# Patient Record
Sex: FEMALE | Race: WHITE | NOT HISPANIC OR LATINO | Employment: OTHER | URBAN - METROPOLITAN AREA
[De-identification: names, ages, dates, MRNs, and addresses within clinical notes are randomized per-mention and may not be internally consistent; named-entity substitution may affect disease eponyms.]

---

## 2017-07-17 ENCOUNTER — LAB VISIT (OUTPATIENT)
Dept: LAB | Facility: HOSPITAL | Age: 82
End: 2017-07-17
Attending: FAMILY MEDICINE
Payer: MEDICARE

## 2017-07-17 ENCOUNTER — TELEPHONE (OUTPATIENT)
Dept: INTERNAL MEDICINE | Facility: CLINIC | Age: 82
End: 2017-07-17

## 2017-07-17 DIAGNOSIS — Z79.01 LONG TERM (CURRENT) USE OF ANTICOAGULANTS: Primary | ICD-10-CM

## 2017-07-17 DIAGNOSIS — Z79.01 LONG TERM (CURRENT) USE OF ANTICOAGULANTS: ICD-10-CM

## 2017-07-17 LAB
INR PPP: 5
PROTHROMBIN TIME: 49.7 SEC

## 2017-07-17 PROCEDURE — 36415 COLL VENOUS BLD VENIPUNCTURE: CPT | Mod: PO

## 2017-07-17 PROCEDURE — 85610 PROTHROMBIN TIME: CPT | Mod: PO

## 2017-07-25 ENCOUNTER — LAB VISIT (OUTPATIENT)
Dept: LAB | Facility: HOSPITAL | Age: 82
End: 2017-07-25
Attending: INTERNAL MEDICINE
Payer: MEDICARE

## 2017-07-25 DIAGNOSIS — Z79.01 LONG TERM (CURRENT) USE OF ANTICOAGULANTS: ICD-10-CM

## 2017-07-25 DIAGNOSIS — Z79.01 LONG TERM (CURRENT) USE OF ANTICOAGULANTS: Primary | ICD-10-CM

## 2017-07-25 LAB
INR PPP: 3.9
PROTHROMBIN TIME: 39.1 SEC

## 2017-07-25 PROCEDURE — 36415 COLL VENOUS BLD VENIPUNCTURE: CPT | Mod: PO

## 2017-07-25 PROCEDURE — 85610 PROTHROMBIN TIME: CPT | Mod: PO

## 2017-07-27 ENCOUNTER — TELEPHONE (OUTPATIENT)
Dept: RADIOLOGY | Facility: HOSPITAL | Age: 82
End: 2017-07-27

## 2017-07-28 ENCOUNTER — HOSPITAL ENCOUNTER (OUTPATIENT)
Dept: RADIOLOGY | Facility: HOSPITAL | Age: 82
Discharge: HOME | End: 2017-07-28
Attending: UROLOGY
Payer: MEDICARE

## 2017-07-28 DIAGNOSIS — R30.0 DYSURIA: ICD-10-CM

## 2017-07-28 PROCEDURE — 76770 US EXAM ABDO BACK WALL COMP: CPT | Mod: TC,PO

## 2017-07-28 PROCEDURE — 76770 US EXAM ABDO BACK WALL COMP: CPT | Mod: 26,,, | Performed by: RADIOLOGY

## 2017-08-10 ENCOUNTER — LAB VISIT (OUTPATIENT)
Dept: LAB | Facility: HOSPITAL | Age: 82
End: 2017-08-10
Attending: INTERNAL MEDICINE
Payer: MEDICARE

## 2017-08-10 DIAGNOSIS — Z79.01 LONG TERM (CURRENT) USE OF ANTICOAGULANTS: Primary | ICD-10-CM

## 2017-08-10 DIAGNOSIS — Z79.01 LONG TERM (CURRENT) USE OF ANTICOAGULANTS: ICD-10-CM

## 2017-08-10 LAB
INR PPP: 2.8
PROTHROMBIN TIME: 27.8 SEC

## 2017-08-10 PROCEDURE — 36415 COLL VENOUS BLD VENIPUNCTURE: CPT | Mod: PO

## 2017-08-10 PROCEDURE — 85610 PROTHROMBIN TIME: CPT | Mod: PO

## 2017-08-17 ENCOUNTER — LAB VISIT (OUTPATIENT)
Dept: LAB | Facility: HOSPITAL | Age: 82
End: 2017-08-17
Attending: FAMILY MEDICINE
Payer: MEDICARE

## 2017-08-17 DIAGNOSIS — Z79.01 LONG TERM (CURRENT) USE OF ANTICOAGULANTS: ICD-10-CM

## 2017-08-17 LAB
INR PPP: 3.1
PROTHROMBIN TIME: 31.4 SEC

## 2017-08-17 PROCEDURE — 85610 PROTHROMBIN TIME: CPT | Mod: PO

## 2017-08-17 PROCEDURE — 36415 COLL VENOUS BLD VENIPUNCTURE: CPT | Mod: PO

## 2017-08-24 ENCOUNTER — LAB VISIT (OUTPATIENT)
Dept: LAB | Facility: HOSPITAL | Age: 82
End: 2017-08-24
Attending: INTERNAL MEDICINE
Payer: MEDICARE

## 2017-08-24 DIAGNOSIS — Z79.01 LONG TERM (CURRENT) USE OF ANTICOAGULANTS: ICD-10-CM

## 2017-08-24 LAB
INR PPP: 2.7
PROTHROMBIN TIME: 27.6 SEC

## 2017-08-24 PROCEDURE — 85610 PROTHROMBIN TIME: CPT | Mod: PO

## 2017-08-24 PROCEDURE — 36415 COLL VENOUS BLD VENIPUNCTURE: CPT | Mod: PO

## 2017-08-31 ENCOUNTER — LAB VISIT (OUTPATIENT)
Dept: LAB | Facility: HOSPITAL | Age: 82
End: 2017-08-31
Attending: INTERNAL MEDICINE
Payer: MEDICARE

## 2017-08-31 DIAGNOSIS — Z79.01 LONG TERM (CURRENT) USE OF ANTICOAGULANTS: ICD-10-CM

## 2017-08-31 LAB
INR PPP: 3.1
PROTHROMBIN TIME: 30.9 SEC

## 2017-08-31 PROCEDURE — 85610 PROTHROMBIN TIME: CPT | Mod: PO

## 2017-08-31 PROCEDURE — 36415 COLL VENOUS BLD VENIPUNCTURE: CPT | Mod: PO

## 2017-11-29 ENCOUNTER — HOSPITAL ENCOUNTER (OUTPATIENT)
Dept: RADIOLOGY | Facility: HOSPITAL | Age: 82
Discharge: HOME | End: 2017-11-29
Attending: NURSE PRACTITIONER
Payer: MEDICARE

## 2017-11-29 DIAGNOSIS — J40 CATARRHAL BRONCHITIS: ICD-10-CM

## 2017-11-29 DIAGNOSIS — J40 CATARRHAL BRONCHITIS: Primary | ICD-10-CM

## 2017-11-29 PROCEDURE — 71020 XR CHEST PA AND LATERAL: CPT | Mod: 26,,, | Performed by: RADIOLOGY

## 2017-11-29 PROCEDURE — 71020 XR CHEST PA AND LATERAL: CPT | Mod: TC,PO

## 2017-12-01 ENCOUNTER — HOSPITAL ENCOUNTER (EMERGENCY)
Facility: HOSPITAL | Age: 82
Discharge: HOME | End: 2017-12-01
Attending: EMERGENCY MEDICINE
Payer: MEDICARE

## 2017-12-01 VITALS
DIASTOLIC BLOOD PRESSURE: 56 MMHG | RESPIRATION RATE: 18 BRPM | WEIGHT: 158 LBS | BODY MASS INDEX: 23.33 KG/M2 | HEART RATE: 97 BPM | OXYGEN SATURATION: 97 % | TEMPERATURE: 98 F | SYSTOLIC BLOOD PRESSURE: 112 MMHG

## 2017-12-01 DIAGNOSIS — K80.20 CALCULUS OF GALLBLADDER WITHOUT CHOLECYSTITIS WITHOUT OBSTRUCTION: ICD-10-CM

## 2017-12-01 DIAGNOSIS — N28.1 RENAL CYST, LEFT: Primary | ICD-10-CM

## 2017-12-01 DIAGNOSIS — K74.60 CIRRHOSIS OF LIVER WITHOUT ASCITES, UNSPECIFIED HEPATIC CIRRHOSIS TYPE: ICD-10-CM

## 2017-12-01 DIAGNOSIS — N39.0 URINARY TRACT INFECTION WITHOUT HEMATURIA, SITE UNSPECIFIED: ICD-10-CM

## 2017-12-01 DIAGNOSIS — K76.89 LIVER NODULE: ICD-10-CM

## 2017-12-01 LAB
BACTERIA #/AREA URNS AUTO: ABNORMAL /HPF
BILIRUB UR QL STRIP: NEGATIVE
CLARITY UR REFRACT.AUTO: ABNORMAL
COLOR UR AUTO: YELLOW
GLUCOSE UR QL STRIP: NEGATIVE
HGB UR QL STRIP: ABNORMAL
HYALINE CASTS UR QL AUTO: 1 /LPF
KETONES UR QL STRIP: NEGATIVE
LEUKOCYTE ESTERASE UR QL STRIP: ABNORMAL
MICROSCOPIC COMMENT: ABNORMAL
NITRITE UR QL STRIP: NEGATIVE
PH UR STRIP: 7 [PH] (ref 5–8)
PROT UR QL STRIP: NEGATIVE
RBC #/AREA URNS AUTO: 5 /HPF (ref 0–4)
SP GR UR STRIP: 1.01 (ref 1–1.03)
SQUAMOUS #/AREA URNS AUTO: 50 /HPF
URN SPEC COLLECT METH UR: ABNORMAL
UROBILINOGEN UR STRIP-ACNC: NEGATIVE EU/DL
WBC #/AREA URNS AUTO: 10 /HPF (ref 0–5)

## 2017-12-01 PROCEDURE — 99284 EMERGENCY DEPT VISIT MOD MDM: CPT

## 2017-12-01 PROCEDURE — 81000 URINALYSIS NONAUTO W/SCOPE: CPT

## 2017-12-01 RX ORDER — TRAMADOL HYDROCHLORIDE 50 MG/1
50 TABLET ORAL EVERY 6 HOURS PRN
Qty: 15 TABLET | Refills: 0 | Status: SHIPPED | OUTPATIENT
Start: 2017-12-01

## 2017-12-01 RX ORDER — DIGOXIN 125 MCG
125 TABLET ORAL DAILY
COMMUNITY

## 2017-12-01 RX ORDER — LEVOFLOXACIN 500 MG/1
500 TABLET, FILM COATED ORAL DAILY
COMMUNITY

## 2017-12-01 RX ORDER — TRAMADOL HYDROCHLORIDE 50 MG/1
50 TABLET ORAL EVERY 6 HOURS PRN
COMMUNITY
End: 2017-12-01

## 2017-12-01 RX ORDER — WARFARIN 2 MG/1
2 TABLET ORAL DAILY
COMMUNITY

## 2017-12-01 RX ORDER — FUROSEMIDE 20 MG/1
20 TABLET ORAL 2 TIMES DAILY
COMMUNITY

## 2017-12-01 RX ORDER — BETHANECHOL CHLORIDE 25 MG/1
10 TABLET ORAL 3 TIMES DAILY
COMMUNITY

## 2017-12-01 RX ORDER — METOCLOPRAMIDE HYDROCHLORIDE 5 MG/5ML
2.5 SOLUTION ORAL 2 TIMES DAILY
COMMUNITY

## 2017-12-01 NOTE — ED PROVIDER NOTES
Encounter Date: 12/1/2017       History     Chief Complaint   Patient presents with    Flank Pain     left flank pain, difficulty urinating     The patient currently presents with concerns of flank pain.  This is localized to the Left flank.  This began 2-3 days ago.  There is not associated nausea and vomiting.  Patient denies associated fever/chills.  There is not a history of prior kidney stones.  Hematuria has not been seen.  Patient is currently taking Levaquin for a presumed respiratory infection per her PCP.  The patient's x-ray completed earlier this week however was unremarkable.          Review of patient's allergies indicates:  No Known Allergies  Past Medical History:   Diagnosis Date    Arrhythmia     Hypertension      Past Surgical History:   Procedure Laterality Date    HYSTERECTOMY       No family history on file.  Social History   Substance Use Topics    Smoking status: Former Smoker     Quit date: 1980    Smokeless tobacco: Not on file    Alcohol use No     Review of Systems   Constitutional: Negative for chills and fever.   HENT: Negative for congestion and rhinorrhea.    Respiratory: Negative for cough, chest tightness, shortness of breath and wheezing.    Cardiovascular: Negative for chest pain, palpitations and leg swelling.   Gastrointestinal: Negative for abdominal pain, constipation, diarrhea, nausea and vomiting.   Genitourinary: Positive for flank pain. Negative for dysuria, frequency, urgency, vaginal bleeding and vaginal discharge.   Skin: Negative for color change and rash.   Allergic/Immunologic: Negative for immunocompromised state.   Neurological: Negative for dizziness, weakness and numbness.   Hematological: Negative for adenopathy. Does not bruise/bleed easily.   All other systems reviewed and are negative.      Physical Exam     Initial Vitals [12/01/17 1518]   BP Pulse Resp Temp SpO2   124/69 73 18 98.3 °F (36.8 °C) 97 %      MAP       87.33         Physical  Exam    Nursing note and vitals reviewed.  Constitutional: She appears well-developed and well-nourished. She is not diaphoretic. No distress.   HENT:   Head: Normocephalic and atraumatic.   Right Ear: External ear normal.   Left Ear: External ear normal.   Nose: Nose normal.   Mouth/Throat: Oropharynx is clear and moist.   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   Eyes: Conjunctivae and EOM are normal. Pupils are equal, round, and reactive to light. No scleral icterus.   Neck: Neck supple. No tracheal deviation present. No JVD present.   Cardiovascular: Normal rate, regular rhythm, normal heart sounds and intact distal pulses. Exam reveals no gallop and no friction rub.    No murmur heard.  Pulmonary/Chest: Breath sounds normal. No respiratory distress. She has no wheezes. She has no rhonchi. She has no rales.   Abdominal: Soft. Bowel sounds are normal. She exhibits no distension. There is no tenderness.   Musculoskeletal: Normal range of motion. She exhibits no edema.   Neurological: She is alert and oriented to person, place, and time. She has normal strength. No cranial nerve deficit or sensory deficit.   Skin: Skin is warm and dry. No rash noted.   Psychiatric: She has a normal mood and affect. Her behavior is normal.         ED Course   Procedures  Labs Reviewed   URINALYSIS - Abnormal; Notable for the following:        Result Value    Appearance, UA Hazy (*)     Occult Blood UA 1+ (*)     Leukocytes, UA Trace (*)     All other components within normal limits   URINALYSIS MICROSCOPIC - Abnormal; Notable for the following:     RBC, UA 5 (*)     WBC, UA 10 (*)     All other components within normal limits        Imaging Results          CT Renal Stone Study ABD Pelvis WO (Final result)  Result time 12/01/17 17:04:37    Final result by EVERARDO Rodriguez Sr., MD (12/01/17 17:04:37)                 Impression:      1. A PEG tube is in place. Its tip is located in the stomach. There  are moderate inflammatory changes adjacent to the PEG tube which also involves the superior aspect of the left side of the transverse portion of the colon.   2. There are multiple small stones in the dependent portion of the gallbladder. One of the larger stones measures 2 mm.   3. The liver has a nodular surface. This is characteristic of hepatic cirrhosis.  4. There is an 11 mm oval-shaped area of hypodensity in the anterior aspect of the left lobe of the liver. This area has a Hounsfield measurement of 3.   5. There is a 7 mm oval-shaped subtle hyperdense area in the medullary portion of the inferior pole of the left kidney. This may represent a hemorrhagic cyst.  6. There is a moderate amount of atherosclerosis.      All CT scans at this facility use dose modulation, iterative reconstruction, and/or weight base dosing when appropriate to reduce radiation dose when appropriate to reduce radiation dose to as low as reasonably achievable.      Electronically signed by: EVERARDO YI MD  Date:     12/01/17  Time:    17:04              Narrative:    CT of abdomen and pelvis without IV Contrast    History: Left flank pain    Technique: Standard abdomen and pelvis CT protocol without oral or IV contrast was performed.    Finding: The size of the heart is within normal limits. There is a mild amount of bronchiectasis in both lower lobes. There are mild dependent atelectatic changes in both lungs. There is no pneumothorax or pleural effusion.    There is an 11 mm oval-shaped area of hypodensity in the anterior aspect of the left lobe of the liver. This area has a Hounsfield measurement of 3. The liver has a nodular surface. There are multiple small stones in the dependent portion of the gallbladder. One of the larger stones measures 2 mm. There is mild generalized atrophy of the pancreas. The spleen and adrenals are normal in appearance. There is a mild amount of bilateral perinephric stranding. There is a 7 mm oval-shaped  subtle hyperdense area in the medullary portion of the inferior pole of the left kidney. The ureters and the urinary bladder are normal in appearance. The appendix is absent. There are no inflammatory changes in expected location of the appendix. A PEG tube is in place. Its tip is located in the stomach. There are moderate inflammatory changes adjacent to the PEG tube which also involves the superior aspect of the left side of the transverse portion of the colon. The uterus is absent. The ovaries are not visualized. There is no free fluid within the abdomen or pelvis. There is no pneumoperitoneum. There is a moderate amount of atherosclerosis.                                 Medical Decision Making:   ED Management:  All findings were reviewed with the patient/family in detail along with the diagnosis of UTI and left renal cysts as well as incidental findings of cholelithiasis and apparent cirrhosis.  Patient and family also advised of a diagnosis of a liver nodule that will need FU imaging.  I see no indication of an emergent process beyond that addressed during our encounter but have duly counseled the patient/family regarding the need for prompt follow-up as well as the indications that should prompt immediate return to the emergency room should new or worrisome developments occur.  The patient/family communicates understanding of all this information and all remaining questions and concerns were addressed at this time.                       ED Course      Clinical Impression:   The primary encounter diagnosis was Renal cyst, left. Diagnoses of Calculus of gallbladder without cholecystitis without obstruction, Cirrhosis of liver without ascites, unspecified hepatic cirrhosis type, Liver nodule, and Urinary tract infection without hematuria, site unspecified were also pertinent to this visit.                           Bruce Cha MD  12/04/17 0400